# Patient Record
Sex: FEMALE | Race: WHITE
[De-identification: names, ages, dates, MRNs, and addresses within clinical notes are randomized per-mention and may not be internally consistent; named-entity substitution may affect disease eponyms.]

---

## 2020-09-17 ENCOUNTER — HOSPITAL ENCOUNTER (OUTPATIENT)
Dept: HOSPITAL 11 - JP.SDS | Age: 65
Discharge: HOME | End: 2020-09-17
Attending: SURGERY
Payer: COMMERCIAL

## 2020-09-17 VITALS — DIASTOLIC BLOOD PRESSURE: 74 MMHG | HEART RATE: 46 BPM | SYSTOLIC BLOOD PRESSURE: 139 MMHG

## 2020-09-17 DIAGNOSIS — K29.70: Primary | ICD-10-CM

## 2020-09-17 DIAGNOSIS — K21.9: ICD-10-CM

## 2020-09-17 PROCEDURE — 74177 CT ABD & PELVIS W/CONTRAST: CPT

## 2020-09-17 PROCEDURE — 87081 CULTURE SCREEN ONLY: CPT

## 2020-09-17 PROCEDURE — 82565 ASSAY OF CREATININE: CPT

## 2020-09-17 PROCEDURE — 36415 COLL VENOUS BLD VENIPUNCTURE: CPT

## 2020-09-17 PROCEDURE — 43239 EGD BIOPSY SINGLE/MULTIPLE: CPT

## 2020-09-17 NOTE — CT
Abdomen Pelvis w Cont

 

CLINICAL HISTORY: Epigastric pain  

 

COMPARISON: 2014. 

 

TECHNIQUE: Transverse scans were obtained from the base of the lungs to the

pubic symphysis following oral contrast and IV infusion of contrast.Auto dosage

reduction and iterative reconstructiontechniques employed.

 

FINDINGS: The lung bases show some scattered pleural parenchymal scarring. There

is diffuse bilateral bronchiectasis. The liver shows some diffuse intrahepatic

biliary dilatation. The common bile duct measures 1.6 cm.. The gallbladder has

been removed. The spleen has a normal size and shape. The pancreas shows no mass

or inflammatory change. The adrenal glands appear normal bilaterally . The

kidneys show no mass or inflammatory change. The ureters have normal course and

caliber. The bladder is mildly distended with no filling defects or wall

thickening. The aorta has a normal caliber.

There is no suspicious retroperitoneal adenopathy. 

The small intestinal configuration is nonacute. There is moderate retained feces

throughout the colon. Abdominal pelvic fat planes and low pelvic side walls are

well demarcated.

 

 

IMPRESSION: No mass, adenopathy or inflammatory change

 

There is some biliary dilatation. This is likely related to previous

cholecystectomy.

 

Bibasal pleural parenchymal scarring

 

Bilateral bronchiectasis

 

Moderate fecal retention

## 2020-09-21 NOTE — OR
DATE OF PROCEDURE:  09/17/2020

 

SURGEON:  Christian Dalton MD

 

PREOPERATIVE DIAGNOSIS:  Ongoing epigastric pain.

 

POSTOPERATIVE DIAGNOSIS:  Mild patchy antral gastritis.

 

OPERATIVE PROCEDURE:  Esophagogastroduodenoscopy with antral biopsies for CLOtest.

 

ANESTHESIA:  IV sedation.

 

INDICATION FOR PROCEDURE:  This is a 65-year-old female presenting with some ongoing

epigastric discomfort.  This has been resistant to proton pump inhibitor use thus far.  She

is status post an esophagus procedure several years ago for gastroesophageal reflux disease,

but at this point, does not have any significant reflux symptoms, but more persistent

epigastric pain.  The plan is to proceed with upper GI endoscopy with biopsies as indicated.

Potential risks including bleeding and perforation were discussed and the patient wishes to

proceed.

 

DETAILS OF PROCEDURE:  The patient was taken to the operating room and placed in the left

lateral decubitus position.  IV sedation was administered, after which the upper GI

endoscope was passed orally through the length of the esophagus into the stomach with

retroflexion view of the fundus and thereafter through the pyloric channel and into the

proximal duodenum.

 

Findings included normal hypopharynx, larynx, upper esophageal sphincter, and esophageal

body.  At the EG junction, interestingly, at this point, there is no gross inflammation as

when passed into the stomach retroflexion confirmed a Nissen type effect in terms of rolling

over the mucosa at the EG junction with the esophagus sutures still in place.  At this

point, the esophagus procedure was still functional in terms of anti-reflux effect.

 

Within the stomach, there was some mild patchy antral gastritis.  Otherwise, the pyloric

channel and duodenum third and fourth portions were both unremarkable.

 

At this point, biopsies were obtained from the antrum and sent for CLOtest for H. pylori.

Minimal bleeding from biopsy sites was seen and the procedure then concluded.

 

The patient is already on maximum dose PPI treatment.  We will add Carafate 1 g to be taken

q.i.d. on an empty stomach to that regimen.

 

The patient overall is symptomatic.  This is not all that well explained by the amount of

gastritis present.  Given this, we obtained a CT scan of the abdomen and pelvis and this was

such that it showed no significant abnormalities.  The patient will be following up with

Norma Freire PA-C at [facility] in 2 to 3 weeks.

 

 

 

 

Christian Dalton MD

DD:  09/20/2020 11:42:58

DT:  09/21/2020 12:34:25

Job #:  [id number]

## 2020-10-16 ENCOUNTER — HOSPITAL ENCOUNTER (OUTPATIENT)
Dept: HOSPITAL 11 - JP.SDS | Age: 65
Discharge: HOME | End: 2020-10-16
Attending: SURGERY
Payer: COMMERCIAL

## 2020-10-16 VITALS — SYSTOLIC BLOOD PRESSURE: 146 MMHG | HEART RATE: 54 BPM | DIASTOLIC BLOOD PRESSURE: 67 MMHG

## 2020-10-16 DIAGNOSIS — K21.9: ICD-10-CM

## 2020-10-16 DIAGNOSIS — I25.10: ICD-10-CM

## 2020-10-16 DIAGNOSIS — K64.9: ICD-10-CM

## 2020-10-16 DIAGNOSIS — R19.5: ICD-10-CM

## 2020-10-16 DIAGNOSIS — E78.5: ICD-10-CM

## 2020-10-16 DIAGNOSIS — R10.9: Primary | ICD-10-CM

## 2020-10-16 PROCEDURE — 45378 DIAGNOSTIC COLONOSCOPY: CPT

## 2020-10-25 NOTE — OR
DATE OF PROCEDURE:  10/16/2020

 

SURGEON:  Christian Dalton MD

 

PREOPERATIVE DIAGNOSIS:  History of abdominal cramping and mucus within the stool.

 

POSTOPERATIVE DIAGNOSIS:  Grossly normal colonoscopic examination.

 

OPERATIVE PROCEDURE:  Colonoscopy with random colorectal biopsies to rule out microscopic

colitis.

 

ANESTHESIA:  IV sedation.

 

INDICATION FOR PROCEDURE:  This is a 65-year-old female presenting with some ongoing crampy

abdominal pain along with some intermittent mucus within the stool.  Plan is to proceed with

flexible colonoscopy with biopsies and/or polypectomy as indicated.  Potential risks

including bleeding and perforation were discussed, and the patient wishes to proceed.

 

DETAILS OF PROCEDURE:  The patient was taken to the operating room, placed in a left lateral

decubitus position.  IV sedation was administered after which the digital rectal exam was

performed and was unremarkable.  Colonoscope was passed into the rectum with retroflexion

revealing uncomplicated hemorrhoidal columns.  Scope was eventually passed to the cecum.

Prep was quite good, although a small liquid stool present.  To that level, there were no

gross abnormalities noted.  There were no areas of diverticular disease, colitis, polyps, or

other signs of neoplasia.  At this point, multiple biopsies were obtained beginning in the

cecum extending down through the rectum and sent for histologic evaluation to rule out any

microscopic colitis and the procedure then concluded.  The patient was taken to the recovery

room in satisfactory condition.

 

Plan will be to have the patient follow up with Norma Freire PA-C, roughly in one week.

Should there not be any satisfactory resolution of this problem, one might consider a

Gastroenterology consultation.

 

 

 

 

Christian Dalton MD

DD:  10/24/2020 19:15:02

DT:  10/25/2020 10:35:35

Job #:  [id number]

## 2021-03-06 ENCOUNTER — HOSPITAL ENCOUNTER (EMERGENCY)
Dept: HOSPITAL 11 - JP.ED | Age: 66
Discharge: HOME | End: 2021-03-06
Payer: MEDICARE

## 2021-03-06 VITALS — SYSTOLIC BLOOD PRESSURE: 141 MMHG | HEART RATE: 76 BPM | DIASTOLIC BLOOD PRESSURE: 78 MMHG

## 2021-03-06 DIAGNOSIS — Z79.899: ICD-10-CM

## 2021-03-06 DIAGNOSIS — E78.00: ICD-10-CM

## 2021-03-06 DIAGNOSIS — K21.9: ICD-10-CM

## 2021-03-06 DIAGNOSIS — Z79.82: ICD-10-CM

## 2021-03-06 DIAGNOSIS — M19.90: ICD-10-CM

## 2021-03-06 DIAGNOSIS — Z88.8: ICD-10-CM

## 2021-03-06 DIAGNOSIS — R07.89: Primary | ICD-10-CM

## 2021-03-06 NOTE — EDM.PDOC
ED HPI GENERAL MEDICAL PROBLEM





- General


Chief Complaint: General


Stated Complaint: MEDICAL


Time Seen by Provider: 03/06/21 15:34


Source of Information: Reports: Patient, Family, RN Notes Reviewed


History Limitations: Reports: No Limitations





- History of Present Illness


INITIAL COMMENTS - FREE TEXT/NARRATIVE: 





65-year-old female presents emergency department with a complaint of chest pain 

and shortness of breath, this all began over the last 24 hours she believes is 

related to recently taking a Zofran after taking this medication she has felt 

short of breath she has felt diaphoretic and now feels she has some chest pain 

and dyspnea.  She recently had a coronary catheterization about 5 years ago 

which demonstrated she had a 50% blockage in 1 vessel and 30% in a couple other 

vessels.  She is quite anxious





- Related Data


                                    Allergies











Allergy/AdvReac Type Severity Reaction Status Date / Time


 


ondansetron [From Zofran] Allergy  Lightheaded Verified 03/06/21 15:16





   ness  











Home Meds: 


                                    Home Meds





Aspirin [Halfprin] 81 mg PO DAILY 11/27/13 [History]


Citalopram Hydrobromide [Celexa] 20 mg PO DAILY 11/27/13 [History]


Estrogens, Conjugated [Premarin] 0.3 mg PO DAILY 11/27/13 [History]


Gabapentin [Neurontin] 600 mg PO BEDTIME 11/27/13 [History]


Multivitamin [Multi-Vitamin Daily] 1 each PO DAILY 11/27/13 [History]


Omeprazole [Prilosec] 20 mg PO BID 11/27/13 [History]


Rosuvastatin [Crestor] 10 mg PO DAILY 11/27/13 [History]


valACYclovir [Valtrex] 2,000 mg PO Q12H PRN 01/16/15 [History]


ALPRAZolam [Xanax] 1 mg PO BEDTIME 09/14/20 [History]


Fluticasone Propionate [Flonase] 2 puff NS DAILY 09/14/20 [History]


Iron Vitamin C 1 tab PO DAILY 09/14/20 [History]


Pramipexole Di-HCl [Mirapex] 0.125 mg PO BEDTIME 09/14/20 [History]


tiZANidine HCl [Zanaflex] 2 mg PO DAILY 09/14/20 [History]


traMADol [Ultram] 50 mg PO QID PRN 09/14/20 [History]


Sucralfate [Carafate] 1 gm PO QID 10/16/20 [History]











Past Medical History


HEENT History: Reports: Cataract, Impaired Vision, Sinusitis, Other (See Below)


Other HEENT History: decreased hearing left ear


Cardiovascular History: Reports: High Cholesterol


Gastrointestinal History: Reports: Chronic Constipation, Gastritis, GERD, 

Hemorrhoids


OB/GYN History: Reports: Pregnancy


Musculoskeletal History: Reports: Arthritis, Back Pain, Chronic, Fibromyalgia, 

Neck Pain, Chronic


Neurological History: Reports: Headaches, Chronic





- Infectious Disease History


Infectious Disease History: Reports: Chicken Pox





- Past Surgical History


HEENT Surgical History: Reports: Cataract Surgery, Other (See Below)


Other HEENT Surgeries/Procedures: mastoidectomy


Cardiovascular Surgical History: Reports: None


GI Surgical History: Reports: Appendectomy, Cholecystectomy, Colonoscopy, EGD, 

Esophageal Dilatation, Hernia Repair/Other, Nissen Fundoplication, Other (See 

Below)


Other GI Surgeries/Procedures: rectocele


Female  Surgical History: Reports: Hysterectomy, Oophorectomy, Other (See 

Below)


Other Female  Surgeries/Procedures: bladder suspension


Neurological Surgical History: Reports: None


Musculoskeletal Surgical History: Reports: Arthroscopic Knee, Carpal Tunnel, 

Knee Replacement





Social & Family History





- Family History


Family Medical History: No Pertinent Family History





- Tobacco Use


Tobacco Use Status *Q: Never Tobacco User





- Caffeine Use


Caffeine Use: Reports: Coffee





ED ROS GENERAL





- Review of Systems


Review Of Systems: See Below


Constitutional: Reports: Diaphoresis


Respiratory: Reports: Shortness of Breath


Cardiovascular: Reports: Chest Pain, Dyspnea on Exertion


GI/Abdominal: Reports: Nausea





ED EXAM, GENERAL





- Physical Exam


Exam: See Below


Exam Limited By: No Limitations


General Appearance: Alert, Anxious


Respiratory/Chest: No Respiratory Distress, Lungs Clear, Normal Breath Sounds, 

No Accessory Muscle Use, Chest Non-Tender


Cardiovascular: Regular Rate, Rhythm, No Murmur


GI/Abdominal: Soft, Non-Tender


Extremities: No Pedal Edema





Course





- Vital Signs


Last Recorded V/S: 


                                Last Vital Signs











Temp  97.8 F   03/06/21 15:32


 


Pulse  76   03/06/21 15:32


 


Resp  14   03/06/21 15:32


 


BP  141/78 H  03/06/21 15:32


 


Pulse Ox  100   03/06/21 15:32














- Orders/Labs/Meds


Orders: 


                               Active Orders 24 hr











 Category Date Time Status


 


 Cardiac Monitoring [RC] .As Directed Care  03/06/21 15:44 Active


 


 EKG Documentation Completion [RC] ASDIRECTED Care  03/06/21 15:45 Active


 


 Chest 2V [CR] Stat Exams  03/06/21 15:45 Taken


 


 EKG 12 Lead [EK] Stat Ther  03/06/21 15:45 Ordered











Labs: 


                                Laboratory Tests











  03/06/21 03/06/21 Range/Units





  16:20 16:20 


 


WBC  5.0   (4.5-11.0)  K/uL


 


RBC  4.64   (3.30-5.50)  M/uL


 


Hgb  13.8   (12.0-15.0)  g/dL


 


Hct  42.2   (36.0-48.0)  %


 


MCV  91   (80-98)  fL


 


MCH  30   (27-31)  pg


 


MCHC  33   (32-36)  %


 


Plt Count  222   (150-400)  K/uL


 


Neut % (Auto)  63   (36-66)  %


 


Lymph % (Auto)  29   (24-44)  %


 


Mono % (Auto)  7 H   (2-6)  %


 


Eos % (Auto)  1 L   (2-4)  %


 


Baso % (Auto)  0   (0-1)  %


 


Sodium   143  (140-148)  mmol/L


 


Potassium   3.3 L  (3.6-5.2)  mmol/L


 


Chloride   104  (100-108)  mmol/L


 


Carbon Dioxide   25  (21-32)  mmol/L


 


Anion Gap   17.3 H  (5.0-14.0)  mmol/L


 


BUN   14  D  (7-18)  mg/dL


 


Creatinine   0.7  (0.6-1.0)  mg/dL


 


Est Cr Clr Drug Dosing   70.76  mL/min


 


Estimated GFR (MDRD)   > 60  (>60)  


 


Glucose   105  ()  mg/dL


 


Calcium   9.9  (8.5-10.1)  mg/dL


 


Total Bilirubin   1.3 H  (0.2-1.0)  mg/dL


 


AST   24  (15-37)  U/L


 


ALT   29  (12-78)  U/L


 


Alkaline Phosphatase   79  ()  U/L


 


Troponin I   < 0.017  (0.000-0.056)  ng/mL


 


Total Protein   7.2  (6.4-8.2)  g/dL


 


Albumin   3.9  (3.4-5.0)  g/dL


 


Globulin   3.3  (2.3-3.5)  g/dL


 


Albumin/Globulin Ratio   1.2  (1.2-2.2)  














Departure





- Departure


Time of Disposition: 17:08


Disposition: Home, Self-Care 01


Condition: Fair


Clinical Impression: 


 Atypical chest pain








- Discharge Information


Instructions:  Nonspecific Chest Pain, Adult


Referrals: 


Norma Freire PA-C [Primary Care Provider] - 


Forms:  ED Department Discharge


Additional Instructions: 


Please follow-up with your primary care next week for further evaluation, call 

return to the emergency department worsening of symptoms





Sepsis Event Note (ED)





- Evaluation


Sepsis Screening Result: No Definite Risk





- Focused Exam


Vital Signs: 


                                   Vital Signs











  Temp Pulse Resp BP Pulse Ox


 


 03/06/21 15:32  97.8 F  76  14  141/78 H  100


 


 03/06/21 14:59  97.8 F  76  14  141/78 H  100














- My Orders


Last 24 Hours: 


My Active Orders





03/06/21 15:44


Cardiac Monitoring [RC] .As Directed 





03/06/21 15:45


EKG Documentation Completion [RC] ASDIRECTED 


Chest 2V [CR] Stat 


EKG 12 Lead [EK] Stat 














- Assessment/Plan


Last 24 Hours: 


My Active Orders





03/06/21 15:44


Cardiac Monitoring [RC] .As Directed 





03/06/21 15:45


EKG Documentation Completion [RC] ASDIRECTED 


Chest 2V [CR] Stat 


EKG 12 Lead [EK] Stat 











Plan: 





Assessment





Acuity = acute





Site and laterality = atypical chest pain





Etiology  = unknown





Manifestations = none





Location of injury =  Home





Lab values = CBC CMP troponin all within normal limits EKG demonstrates sinus 

rhythm no ST elevations or depressions, EKG demonstrates normal sinus rhythm at 

64,  no atrial enlargement, no ventricular enlargement, no axis deviation,  no T

 wave inversions, no significant ST depressions or elevations, no Q waves noted 

good R wave progression.  Chest x-ray I did review films myself I cannot 

appreciate any acute process, the official read from radiology is pending





Plan


I did review lab work chest x-ray EKG results with her as she is going to 

follow-up with her primary care next week and start working on her medications 

to try and reduce the load

















 This note was dictated using dragon voice recognition software please call with

 any questions on syntax or grammar.

## 2021-03-08 NOTE — CR
CHEST: 2 view

 

CLINICAL HISTORY:Chest pain

 

COMPARISON:2015

 

FINDINGS:  Lung fields are hyperaerated. There is a pectus deformity. The heart

size, pulmonary vascularity and hilar structures are normal. No infiltrate

effusion or pneumothorax is seen. There are atherosclerotic changes in the

aorta.

 

IMPRESSION: No acute cardiopulmonary process

 

Lungs are hyperaerated exaggerated by pectus deformity